# Patient Record
Sex: MALE | Race: WHITE | NOT HISPANIC OR LATINO | URBAN - METROPOLITAN AREA
[De-identification: names, ages, dates, MRNs, and addresses within clinical notes are randomized per-mention and may not be internally consistent; named-entity substitution may affect disease eponyms.]

---
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
## 2023-08-14 ENCOUNTER — OFFICE VISIT (OUTPATIENT)
Dept: MEDICAL GROUP | Facility: CLINIC | Age: 18
End: 2023-08-14
Payer: MEDICAID

## 2023-08-14 VITALS
WEIGHT: 151 LBS | HEIGHT: 75 IN | SYSTOLIC BLOOD PRESSURE: 122 MMHG | HEART RATE: 87 BPM | RESPIRATION RATE: 16 BRPM | DIASTOLIC BLOOD PRESSURE: 78 MMHG | BODY MASS INDEX: 18.77 KG/M2 | OXYGEN SATURATION: 98 %

## 2023-08-14 DIAGNOSIS — Z00.00 ROUTINE HEALTH MAINTENANCE: ICD-10-CM

## 2023-08-14 DIAGNOSIS — Z83.49 FAMILY HISTORY OF HYPOGLYCEMIA: ICD-10-CM

## 2023-08-14 PROCEDURE — 3078F DIAST BP <80 MM HG: CPT

## 2023-08-14 PROCEDURE — 99213 OFFICE O/P EST LOW 20 MIN: CPT | Mod: GE

## 2023-08-14 PROCEDURE — 3074F SYST BP LT 130 MM HG: CPT

## 2023-08-14 ASSESSMENT — PATIENT HEALTH QUESTIONNAIRE - PHQ9: CLINICAL INTERPRETATION OF PHQ2 SCORE: 0

## 2023-08-14 NOTE — ASSESSMENT & PLAN NOTE
-Encouraged pt to pursue healthy eating options that are high in calories such as olive oil  -TSH to r/o overactive thyroid.

## 2023-08-14 NOTE — PROGRESS NOTES
"Subjective:     CC: Establish care    HISTORY OF THE PRESENT ILLNESS: Patient is a 17 y.o. male. This pleasant patient is here today to establish care and discuss red feet. Pt has not seen a provider in many years.      Pt's only medical concern is that his hands and feet turn red.  Not notably painful or in response to cold.    Problem   Family History of Hypoglycemia    Mother has hypoglycemia, diagnosed but she is unaware of another causing condition.  She would like labs to check this on pt.       Adult Bmi <19 Kg/Sq M    Pt states he has always been lean.  Feels like he eats well.  Tries to eat protein.         PHQ-2 : 0    Soc Hx:  Denies alcohol, drug, vaping and tobacco use  Lives in [place] with mother, sister and her children  No safety concerns at home.    Not sexually active - counseled on safe sex and STD screening  Goes to dentist, brushes teeth, is planning on getting wisdom teeth pulled  No currently working or going to school - is raising new puppies at home.      Current Outpatient Medications Ordered in Epic   Medication Sig Dispense Refill    calcium carbonate (TUMS) 500 MG Chew Tab Chew 500 mg every day.       No current [facility]-ordered facility-administered medications on file.         Objective:       Exam: /78 (BP Location: Left arm, Patient Position: Sitting, BP Cuff Size: Adult)   Pulse 87   Resp 16   Ht 1.905 m (6' 3\")   Wt 68.5 kg (151 lb)   SpO2 98%  Body mass index is 18.87 kg/m².    General: Normal appearing. No distress.  HEENT: Normocephalic. Eyes conjunctiva clear lids without ptosis, pupils equal and reactive to light accommodation, ears normal shape and contour: Right ear has slight erythema on pinna with small leos just outside canal.   Neck: Supple. Thyroid is not enlarged.  Pulmonary: Clear to ausculation.  Normal effort. No rales, ronchi, or wheezing.  Cardiovascular: Regular rate and rhythm without murmur. Carotid and radial pulses are intact and equal " bilaterally.  Abdomen: Soft, nontender, nondistended.   Neurologic: Grossly nonfocal  Skin: Warm and dry.  No obvious lesions.  Musculoskeletal: Normal gait. No obvious abnormalities.  No extremity cyanosis, clubbing, or edema.  Psych: Normal mood and affect. Alert and oriented x3. Judgment and insight is normal.    Labs: Pending    Assessment & Plan:   17 y.o. male here to establish care, no large medical issues, only the following -    Family history of hypoglycemia  Pt asx  -A1c, CMP ordered on pt.       Adult BMI <19 kg/sq m  -Encouraged pt to pursue healthy eating options that are high in calories such as olive oil  -TSH to r/o overactive thyroid.      Red hands/feet do not seem to represent Raynauds but will follow up at next visit to assess if sx got worse in Winter.     Follow up: PRN and for annual visit    Miesha Orourke D.O.   PGY-2